# Patient Record
Sex: MALE | Race: WHITE | NOT HISPANIC OR LATINO | Employment: OTHER | URBAN - METROPOLITAN AREA
[De-identification: names, ages, dates, MRNs, and addresses within clinical notes are randomized per-mention and may not be internally consistent; named-entity substitution may affect disease eponyms.]

---

## 2018-11-10 ENCOUNTER — OFFICE VISIT (OUTPATIENT)
Dept: URGENT CARE | Facility: CLINIC | Age: 58
End: 2018-11-10
Payer: COMMERCIAL

## 2018-11-10 ENCOUNTER — HOSPITAL ENCOUNTER (EMERGENCY)
Facility: OTHER | Age: 58
Discharge: HOME | End: 2018-11-10
Attending: EMERGENCY MEDICINE
Payer: COMMERCIAL

## 2018-11-10 VITALS
OXYGEN SATURATION: 100 % | BODY MASS INDEX: 29.84 KG/M2 | DIASTOLIC BLOOD PRESSURE: 77 MMHG | TEMPERATURE: 98 F | WEIGHT: 240 LBS | RESPIRATION RATE: 16 BRPM | HEIGHT: 75 IN | SYSTOLIC BLOOD PRESSURE: 150 MMHG | HEART RATE: 68 BPM

## 2018-11-10 VITALS
OXYGEN SATURATION: 98 % | HEIGHT: 75 IN | BODY MASS INDEX: 29.84 KG/M2 | HEART RATE: 78 BPM | TEMPERATURE: 98 F | WEIGHT: 240 LBS | SYSTOLIC BLOOD PRESSURE: 136 MMHG | DIASTOLIC BLOOD PRESSURE: 96 MMHG | RESPIRATION RATE: 16 BRPM

## 2018-11-10 DIAGNOSIS — M79.662 PAIN AND SWELLING OF LEFT LOWER LEG: Primary | ICD-10-CM

## 2018-11-10 DIAGNOSIS — M79.89 LEFT LEG SWELLING: Primary | ICD-10-CM

## 2018-11-10 DIAGNOSIS — M79.89 PAIN AND SWELLING OF LEFT LOWER LEG: Primary | ICD-10-CM

## 2018-11-10 PROCEDURE — 3008F BODY MASS INDEX DOCD: CPT | Mod: CPTII,S$GLB,, | Performed by: NURSE PRACTITIONER

## 2018-11-10 PROCEDURE — 99284 EMERGENCY DEPT VISIT MOD MDM: CPT

## 2018-11-10 PROCEDURE — 99205 OFFICE O/P NEW HI 60 MIN: CPT | Mod: S$GLB,,, | Performed by: NURSE PRACTITIONER

## 2018-11-10 RX ORDER — ESCITALOPRAM OXALATE 10 MG/1
10 TABLET ORAL DAILY
COMMUNITY

## 2018-11-10 RX ORDER — LISINOPRIL 5 MG/1
5 TABLET ORAL DAILY
COMMUNITY

## 2018-11-10 RX ORDER — PRAVASTATIN SODIUM 10 MG/1
10 TABLET ORAL DAILY
COMMUNITY

## 2018-11-10 NOTE — ED TRIAGE NOTES
Pt presents with c/o left left calf and ankle swelling onset a couple days ago. pt states he noticed bruising to lower ankle this morning. Pt reports being on a plane today from [place]. Pt reports he had a scope done to left knee 11/2/18 and has post swelling and was given an US and had a blood clot ruled out a few days ago. Pt reprots hx factor V clotting disorder and hx of humira use for RA, last shot about 4 weeks ago. 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 Pt is well appearing, pt in NAD.

## 2018-11-10 NOTE — ED PROVIDER NOTES
"Encounter Date: 11/10/2018    SCRIBE #1 NOTE: I, Keren Pace, am scribing for, and in the presence of, Dr. Miller.       History     Chief Complaint   Patient presents with    Leg Swelling     pt reports leg swelling and bruising to his Left leg x2 days. Pt had knee replacement 11/2.      Time seen by provider: 2:44 PM    This is a 58 y.o. male who presents to the ED with complaint of left calf swelling that began four days ago. Patient reports four days after knee scope surgery on 11/2, his calf swells up and is extremely painful. Patient had US imaging with unremarkable results and no sign of DVT. Patient reports yesterday morning, his left foot "turned blue." Patient reports history of Factor 5 disease and rheumatoid arthritis, but has not been compliant with medications for 5 weeks due to instructions from surgery. Patient reports severed big toe from work accident years ago.      The history is provided by the patient.     Review of patient's allergies indicates:   Allergen Reactions    Penicillins Rash     Past Medical History:   Diagnosis Date    Blood clotting disorder     Rheumatoid arthritis      Past Surgical History:   Procedure Laterality Date    BACK SURGERY      HERNIA REPAIR      KNEE ARTHROSCOPY      right shoulder sx      TOE AMPUTATION      torn miniscus      TOTAL HIP ARTHROPLASTY       Family History   Problem Relation Age of Onset    No Known Problems Mother     No Known Problems Father      Social History     Tobacco Use    Smoking status: Never Smoker    Smokeless tobacco: Current User     Types: Chew   Substance Use Topics    Alcohol use: Yes     Frequency: Monthly or less    Drug use: No     Review of Systems   Constitutional: Negative for fever.   HENT: Negative for sore throat.    Respiratory: Negative for shortness of breath.    Cardiovascular: Positive for leg swelling. Negative for chest pain.   Gastrointestinal: Negative for nausea.   Genitourinary: Negative for " "dysuria.   Musculoskeletal: Negative for back pain.        Positive for right leg and foot pain.   Skin: Positive for color change. Negative for rash.   Neurological: Negative for weakness.   Hematological: Does not bruise/bleed easily.       Physical Exam     Initial Vitals [11/10/18 1439]   BP Pulse Resp Temp SpO2   (!) 152/103 72 16 98.3 °F (36.8 °C) 99 %      MAP       --         Physical Exam    Nursing note and vitals reviewed.  Constitutional: He appears well-developed and well-nourished. He is not diaphoretic. No distress.   HENT:   Head: Normocephalic and atraumatic.   Eyes: EOM are normal. No scleral icterus.   Neck: Normal range of motion.   Cardiovascular:   Good DPand PT pulses.   Pulmonary/Chest: No respiratory distress.   Abdominal: Soft. Bowel sounds are normal.   Musculoskeletal: Normal range of motion.   Left leg swelling compared to right leg. Ecchymosis around the ankle and anterior leg. "Typically expected ecchymosis from recent surgery." Lower extremities warm to touch. No modeling or cyanosis. Amputated great toe on left foot.   Neurological: He is alert and oriented to person, place, and time.   Psychiatric: He has a normal mood and affect. His behavior is normal. Judgment and thought content normal.         ED Course   Procedures  Labs Reviewed - No data to display       Imaging Results          US Lower Extremity Veins Left (Final result)  Result time 11/10/18 15:38:10   Procedure changed from US Lower Extremity Venous Insufficiency Left     Final result by Glenroy Wetzel MD (11/10/18 15:38:10)                 Impression:      No evidence of deep venous thrombosis in the left lower extremity.      Electronically signed by: Glenroy Wetzel MD  Date:    11/10/2018  Time:    15:38             Narrative:    EXAMINATION:  US LOWER EXTREMITY VEINS LEFT    CLINICAL HISTORY:  left leg swelling. recent surgery. r/o dvt;    TECHNIQUE:  Duplex and color flow Doppler evaluation and graded " compression of the left lower extremity veins was performed.    COMPARISON:  None    FINDINGS:  Left thigh veins: The common femoral, femoral, popliteal, upper greater saphenous, and deep femoral veins are patent and free of thrombus. The veins are normally compressible and have normal phasic flow and augmentation response.    Left calf veins: The visualized calf veins are patent.    Contralateral CFV: The contralateral (right) common femoral vein is patent and free of thrombus.    Miscellaneous: None                                 Medical Decision Making:   Clinical Tests:   Radiological Study: Reviewed and Ordered  ED Management:  A 58-year-old male approximately 1 week post knee surgery presents with left lower extremity swelling. He does have factor 5 Leiden disease.  He had an ultrasound done 4 days ago in [place], where he is from, which was negative for DVT.    The discoloration is dependent ecchymosis likely secondary to surgery.  Ultrasound is negative for DVT.  Reassured the patient.  Do not feel any further workup is indicated here in the emergency department.  He is to follow up with his orthopedic surgeon when he returns home.    Patient discharged home in stable condition. Diagnosis and treatment plan explained to patient. I have answered all questions and the patient is satisfied with the plan of care. The patient demonstrates understanding of the care plan. This is the extent to the patients complaints today here in the emergency department.            Scribe Attestation:   Scribe #1: I performed the above scribed service and the documentation accurately describes the services I performed. I attest to the accuracy of the note.    Attending Attestation:           Physician Attestation for Scribe:  Physician Attestation Statement for Scribe #1: I, Dr. Miller, reviewed documentation, as scribed by Keren Pace in my presence, and it is both accurate and complete.                    Clinical  Impression:     1. Left leg swelling                                   Enoc Miller, DO  11/10/18 9627

## 2018-11-10 NOTE — PROGRESS NOTES
"Subjective:       Patient ID: El Guzman is a 58 y.o. male.    Vitals:  height is 6' 3" (1.905 m) and weight is 108.9 kg (240 lb). His temperature is 97.8 °F (36.6 °C). His blood pressure is 136/96 (abnormal) and his pulse is 78. His respiration is 16 and oxygen saturation is 98%.     Chief Complaint: Joint Swelling (Left knee)    Patient here from [place], has left knee arthroscopy on Nov 2nd. The Tuesday after surgery (4 days later) leg started swelling. Had ultrasound for DVT and was negative. Woke up today and foot is blue/bruised. States leg/foot is tender and sore. Has not taken medication for it.  Denies numbness/tingling.       Edema   This is a new problem. The current episode started yesterday. The problem occurs constantly. The problem has been gradually worsening. Associated symptoms include joint swelling. Pertinent negatives include no abdominal pain, anorexia, arthralgias, change in bowel habit, chest pain, chills, congestion, coughing, diaphoresis, fatigue, fever, headaches, myalgias, nausea, neck pain, numbness, rash, sore throat, swollen glands, urinary symptoms, vertigo, visual change, vomiting or weakness. Nothing aggravates the symptoms. He has tried nothing for the symptoms.     Review of Systems   Constitution: Negative for chills, diaphoresis, fatigue, fever and weakness.   HENT: Negative for congestion and sore throat.    Eyes: Negative for blurred vision.   Cardiovascular: Negative for chest pain.   Respiratory: Negative for cough and shortness of breath.    Skin: Negative for rash.   Musculoskeletal: Positive for joint swelling. Negative for arthralgias, back pain, joint pain, myalgias and neck pain.   Gastrointestinal: Negative for abdominal pain, anorexia, change in bowel habit, diarrhea, nausea and vomiting.   Neurological: Negative for headaches, numbness and vertigo.   Psychiatric/Behavioral: The patient is not nervous/anxious.    All other systems reviewed and are negative.    "   Objective:      Physical Exam   Constitutional: He is oriented to person, place, and time. He appears well-developed and well-nourished. He is cooperative.  Non-toxic appearance. He does not appear ill. No distress.   HENT:   Head: Normocephalic and atraumatic.   Right Ear: Hearing, tympanic membrane, external ear and ear canal normal.   Left Ear: Hearing, tympanic membrane, external ear and ear canal normal.   Nose: Nose normal. No mucosal edema, rhinorrhea or nasal deformity. No epistaxis. Right sinus exhibits no maxillary sinus tenderness and no frontal sinus tenderness. Left sinus exhibits no maxillary sinus tenderness and no frontal sinus tenderness.   Mouth/Throat: Uvula is midline, oropharynx is clear and moist and mucous membranes are normal. No trismus in the jaw. Normal dentition. No uvula swelling. No posterior oropharyngeal erythema.   Eyes: Conjunctivae and lids are normal. Right eye exhibits no discharge. Left eye exhibits no discharge. No scleral icterus.   Sclera clear bilat   Neck: Trachea normal, normal range of motion, full passive range of motion without pain and phonation normal. Neck supple.   Cardiovascular: Normal rate, regular rhythm, normal heart sounds, intact distal pulses and normal pulses.   Pulmonary/Chest: Effort normal and breath sounds normal. No respiratory distress.   Abdominal: Soft. Normal appearance and bowel sounds are normal. He exhibits no distension, no pulsatile midline mass and no mass. There is no tenderness.   Musculoskeletal: Normal range of motion. He exhibits no edema.        Right foot: There is deformity.        Left foot: There is tenderness, bony tenderness, swelling, decreased capillary refill and deformity.   Left great toe amputated. Left lower extremity +2 edema, +1 pulse dorsalis pedis and +1 posterior tibial pulses. Sensation intact, erythema and ecchymosis noted to medial posterior ankle and calf. Tenderness with palpation.    Feet:   Right Foot:   Skin  Integrity: Positive for erythema and warmth.   Neurological: He is alert and oriented to person, place, and time. He exhibits normal muscle tone. Coordination normal.   Skin: Skin is warm, dry and intact. He is not diaphoretic. No pallor.   Psychiatric: He has a normal mood and affect. His speech is normal and behavior is normal. Judgment and thought content normal. Cognition and memory are normal.   Nursing note and vitals reviewed.      Assessment:       1. Pain and swelling of left lower leg        Plan:         Pain and swelling of left lower leg  -     Refer to Emergency Dept.

## 2018-11-10 NOTE — ED NOTES
NEURO: Pt AAO x 4. Behavior and speech appropriate to situation.   CARDIAC: Regular rhythm auscultated  RESPIRATORY: Respirations even and unlabored. Breath sounds clear to all lung fields.   MUSCULOSKELETAL: Active ROM noted to extremities    Ecchymosis noted to left lower calf and ankle. + 1 pitting edema from left knee to ankle. Left DP pulse + 2, cap RF < 3 secs. No heat or erythema noted to left lower leg. band aids in place to surgical incision, no signs of infection noted to knee.

## 2018-11-10 NOTE — PROGRESS NOTES
Pt c/o left lower leg pain and swelling. Pt had left knee arthroscopy on 11/2 in [place]. On 11/6 pt developed left lower leg swelling and tenderness. Surgeon order U/S of LLE, which was negative for DVT. On 11/9/18 Pt developed increased swelling and traveled today without informing his Surgeon. Pt is on vacation. Surg hx: Pt had Left great toe amputated due to work related injury X1 year ago. PMH: see chart.  The LLE is now ecchymotic, erythematous, swollen and tender to palpation with weak distal pulses. Pt is advised to report to the ER immediately for a rule out dvt, cellulitis, and inflammation of LLE.